# Patient Record
Sex: FEMALE | Race: WHITE | NOT HISPANIC OR LATINO | Employment: FULL TIME | URBAN - NONMETROPOLITAN AREA
[De-identification: names, ages, dates, MRNs, and addresses within clinical notes are randomized per-mention and may not be internally consistent; named-entity substitution may affect disease eponyms.]

---

## 2017-08-29 ENCOUNTER — OFFICE VISIT (OUTPATIENT)
Dept: MEDICAL GROUP | Facility: CLINIC | Age: 57
End: 2017-08-29
Payer: MEDICARE

## 2017-08-29 VITALS
TEMPERATURE: 98.9 F | SYSTOLIC BLOOD PRESSURE: 142 MMHG | BODY MASS INDEX: 16.9 KG/M2 | HEART RATE: 81 BPM | OXYGEN SATURATION: 98 % | HEIGHT: 64 IN | DIASTOLIC BLOOD PRESSURE: 90 MMHG | WEIGHT: 99 LBS

## 2017-08-29 DIAGNOSIS — F31.10 BIPOLAR DISORDER, CURRENT EPISODE MANIC WITHOUT PSYCHOTIC FEATURES (HCC): ICD-10-CM

## 2017-08-29 DIAGNOSIS — Z12.11 SPECIAL SCREENING FOR MALIGNANT NEOPLASM OF COLON: ICD-10-CM

## 2017-08-29 DIAGNOSIS — Z13.6 SCREENING FOR CARDIOVASCULAR CONDITION: ICD-10-CM

## 2017-08-29 DIAGNOSIS — I10 ESSENTIAL HYPERTENSION: ICD-10-CM

## 2017-08-29 DIAGNOSIS — Z23 NEED FOR VACCINATION: ICD-10-CM

## 2017-08-29 DIAGNOSIS — Z12.31 VISIT FOR SCREENING MAMMOGRAM: ICD-10-CM

## 2017-08-29 PROCEDURE — 99213 OFFICE O/P EST LOW 20 MIN: CPT | Mod: 25 | Performed by: PHYSICIAN ASSISTANT

## 2017-08-29 PROCEDURE — G0009 ADMIN PNEUMOCOCCAL VACCINE: HCPCS | Performed by: PHYSICIAN ASSISTANT

## 2017-08-29 PROCEDURE — 90732 PPSV23 VACC 2 YRS+ SUBQ/IM: CPT | Performed by: PHYSICIAN ASSISTANT

## 2017-08-29 PROCEDURE — 90715 TDAP VACCINE 7 YRS/> IM: CPT | Performed by: PHYSICIAN ASSISTANT

## 2017-08-29 PROCEDURE — 90472 IMMUNIZATION ADMIN EACH ADD: CPT | Performed by: PHYSICIAN ASSISTANT

## 2017-08-29 RX ORDER — LISINOPRIL 10 MG/1
10 TABLET ORAL EVERY MORNING
Qty: 30 TAB | Refills: 2 | Status: SHIPPED | OUTPATIENT
Start: 2017-08-29 | End: 2018-01-12 | Stop reason: SDUPTHER

## 2017-08-29 ASSESSMENT — PATIENT HEALTH QUESTIONNAIRE - PHQ9
5. POOR APPETITE OR OVEREATING: 2 - MORE THAN HALF THE DAYS
SUM OF ALL RESPONSES TO PHQ QUESTIONS 1-9: 14
CLINICAL INTERPRETATION OF PHQ2 SCORE: 4

## 2017-08-29 NOTE — ASSESSMENT & PLAN NOTE
Patient was diagnosed with bipolar disorder approximately 20 years ago. She was started on paxil a few years after this and this has kept her happy but has not controlled her manic episodes. She would like to restart on some sort of medication but needs advice in regards to which one.

## 2017-08-29 NOTE — ASSESSMENT & PLAN NOTE
Patient has not been taking her lisinopril for about 1 year. She had been well controlled on 10mg in the past. And would like to retry this now. She also drinks nearly a pot of coffee every day and she does not restrict the salt in her diet.

## 2017-08-30 NOTE — PROGRESS NOTES
Chief Complaint   Patient presents with   • Establish Care     wants olanzapine for manic episodes/increases weight gain       HISTORY OF PRESENT ILLNESS: Patient is a 56 y.o. female established patient who presents today for evaluation and management of:    Bipolar disorder, current episode manic without psychotic features (CMS-HCC)  Patient was diagnosed with bipolar disorder approximately 20 years ago. She was started on paxil a few years after this and this has kept her happy but has not controlled her manic episodes. She would like to restart on some sort of medication but needs advice in regards to which one.     HTN (hypertension)  Patient has not been taking her lisinopril for about 1 year. She had been well controlled on 10mg in the past. And would like to retry this now. She also drinks nearly a pot of coffee every day and she does not restrict the salt in her diet.        Patient Active Problem List    Diagnosis Date Noted   • Bipolar disorder, current episode manic without psychotic features (CMS-HCC) 2017   • Breast lump 2016   • HTN (hypertension) 2013   • Cerebral infarction (CMS-HCC) 2013   • Depression        Allergies:Keflex and Morphine    Current Outpatient Prescriptions   Medication Sig Dispense Refill   • lisinopril (PRINIVIL) 10 MG Tab Take 1 Tab by mouth every morning. 30 Tab 2     No current facility-administered medications for this visit.        Social History   Substance Use Topics   • Smoking status: Current Every Day Smoker     Packs/day: 0.50     Years: 35.00     Types: Cigarettes   • Smokeless tobacco: Never Used   • Alcohol use No       Family Status   Relation Status   • Mother    • Father    No family history on file.    Review of Systems:   Constitutional: Negative for fever, chills, weight loss and malaise/fatigue.   HENT: Negative for ear pain, nosebleeds, congestion, sore throat and neck pain.    Eyes: Negative for blurred vision.  "  Respiratory: POsitive for Smokers occasional cough, sputum production, shortness of breath and wheezing.    Cardiovascular: Negative for chest pain, palpitations, orthopnea and leg swelling.   Gastrointestinal: Negative for heartburn, nausea, vomiting and abdominal pain.   Genitourinary: Negative for dysuria, urgency and frequency.   Musculoskeletal: Negative for myalgias, back pain and joint pain.   Skin: Negative for rash and itching.   Neurological: Negative for dizziness, tingling, tremors, sensory change, focal weakness and headaches.   Endo/Heme/Allergies: Does not bruise/bleed easily.   Psychiatric/Behavioral: Positive for depression with more frequent dion. Negative for, suicidal ideas and memory loss.  The patient is n frequently ot nervous/anxious and does not have insomnia.      Exam:  Blood pressure 142/90, pulse 81, temperature 37.2 °C (98.9 °F), height 1.626 m (5' 4\"), weight 44.9 kg (99 lb), SpO2 98 %.  Body mass index is 16.99 kg/m².  General:  Thin female in NAD  Head: is grossly normal.  Neck: Supple without masses. Thyroid is not visibly enlarged.  Pulmonary: Fine crackles auscultated in all lobes bilaterally. Normal effort at rest and in conversation. No rhonchi or wheeze auscultated.   Cardiovascular: Regular rate and rhythm without murmur. Carotid and radial pulses are intact and equal bilaterally.  Extremities: no clubbing, cyanosis, or edema.  Behavioral: Patient has very rapid speech and some flight of ideas however she has normal judgment and insight.    Medical decision-making and discussion:  1. Essential hypertension  Patient was advised to cut back her caffeine intake to one caffeinated beverage every day. She was advised to reduce the salt in her diet and increase her daily water intake.  - lisinopril (PRINIVIL) 10 MG Tab; Take 1 Tab by mouth every morning.  Dispense: 30 Tab; Refill: 2  - CMP (12)  - CBC WITHOUT DIFFERENTIAL; Future  - TSH WITH REFLEX TO FT4; Future    2. Bipolar " disorder, current episode manic without psychotic features (CMS-HCC)  Patient was advised that if she is unable to get into psychiatry very quickly, I am willing to continue writing for the Paxil prescription that she had received before while she is awaiting her appointment.  - REFERRAL TO PSYCHIATRY  - Patient has been identified as being depressed and appropriate orders and counseling have been given  - CBC WITHOUT DIFFERENTIAL; Future  - TSH WITH REFLEX TO FT4; Future    3. Visit for screening mammogram  - [id number]; Future    4. Special screening for malignant neoplasm of colon  - OCCULT BLOOD,FECAL,IMMUNOASSAY    5. Need for vaccination  - Tdap =>6yo IM  - PneumoVax PPV23 =>3yo    6. Screening for cardiovascular condition  - LIPID PANEL      Please note that this dictation was created using voice recognition software. I have made every reasonable attempt to correct obvious errors, but I expect that there are errors of grammar and possibly content that I did not discover before finalizing the note.      Return for 1 week BP recheck MA visit, 6 week HTN checkup, ASAP female annual 40min.

## 2017-10-02 ENCOUNTER — OFFICE VISIT (OUTPATIENT)
Dept: URGENT CARE | Facility: PHYSICIAN GROUP | Age: 57
End: 2017-10-02
Payer: MEDICARE

## 2017-10-02 VITALS
SYSTOLIC BLOOD PRESSURE: 140 MMHG | HEART RATE: 86 BPM | RESPIRATION RATE: 18 BRPM | OXYGEN SATURATION: 94 % | DIASTOLIC BLOOD PRESSURE: 98 MMHG | BODY MASS INDEX: 16.9 KG/M2 | TEMPERATURE: 98.5 F | WEIGHT: 99 LBS | HEIGHT: 64 IN

## 2017-10-02 DIAGNOSIS — S16.1XXA ACUTE STRAIN OF NECK MUSCLE, INITIAL ENCOUNTER: ICD-10-CM

## 2017-10-02 DIAGNOSIS — M25.511 ACUTE PAIN OF RIGHT SHOULDER: ICD-10-CM

## 2017-10-02 PROCEDURE — 99214 OFFICE O/P EST MOD 30 MIN: CPT | Performed by: PHYSICIAN ASSISTANT

## 2017-10-02 RX ORDER — ACETAMINOPHEN AND CODEINE PHOSPHATE 300; 30 MG/1; MG/1
1-2 TABLET ORAL EVERY 6 HOURS PRN
Qty: 15 TAB | Refills: 0 | Status: SHIPPED | OUTPATIENT
Start: 2017-10-02 | End: 2017-10-11

## 2017-10-02 NOTE — PROGRESS NOTES
Chief Complaint   Patient presents with   • Neck Pain     pain radiating to shoulder, seen at [facility] on 29OCT17, Dx with muscle tear       HISTORY OF PRESENT ILLNESS: Patient is a 56 y.o. female who presents today for the following:    Patient comes in for evaluation of neck pain. 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 She was seen at the emergency room the following day. She was put on diazepam but it does not seem to be helping. She states the only thing that seems to help her is Tylenol threes. She complains of pain on the right side of her neck extending into the right shoulder. She has worsening pain with any rotation to the right. Patient reports a history of stroke with right-sided deficit. She has not taken any over-the-counter medication because it never works. She denies distal paresthesias.    Patient Active Problem List    Diagnosis Date Noted   • Bipolar disorder, current episode manic without psychotic features (CMS-HCC) 08/29/2017   • Breast lump 02/09/2016   • HTN (hypertension) 07/23/2013   • Cerebral infarction (CMS-HCC) 07/23/2013   • Depression        Allergies:Keflex; Morphine; and Sulfa drugs    Current Outpatient Prescriptions Ordered in [facility]   Medication Sig Dispense Refill   • Acetaminophen-Codeine 300-30 MG Tab Take 1-2 Tabs by mouth every 6 hours as needed (pain). 15 Tab 0   • lisinopril (PRINIVIL) 10 MG Tab Take 1 Tab by mouth every morning. 30 Tab 2     No current Epic-ordered facility-administered medications on file.        Past Medical History:   Diagnosis Date   • Breast lump 2/9/2016   • Bipolar affective (CMS-HCC)    • Hyperlipidemia    • Hypertension    • Stroke (CMS-HCC)     at age 7 cva due to htn, no residual sx       Social History   Substance Use Topics   • Smoking status: Current Every Day Smoker     Packs/day: 0.50     Years: 35.00     Types: Cigarettes   • Smokeless tobacco: Never Used   • Alcohol use No       Family Status  "  Relation Status   • Mother    • Father    History reviewed. No pertinent family history.    ROS:    Review of Systems   Constitutional: Negative for fever, chills, weight loss and malaise/fatigue.   HENT: Negative for ear pain, nosebleeds, congestion, sore throat and neck pain.    Eyes: Negative for blurred vision.   Respiratory: Negative for cough, sputum production, shortness of breath and wheezing.    Cardiovascular: Negative for chest pain, palpitations, orthopnea and leg swelling.   Gastrointestinal: Negative for heartburn, nausea, vomiting and abdominal pain.   Genitourinary: Negative for dysuria, urgency and frequency.       Exam:  Blood pressure 140/98, pulse 86, temperature 36.9 °C (98.5 °F), resp. rate 18, height 1.626 m (5' 4\"), weight 44.9 kg (99 lb), SpO2 94 %.  General: Well developed, well nourished. No distress.  HEENT: Head is grossly normal.  Neck: Decreased rotation to the right due to pain. No localized tenderness noted.  Pulmonary: Clear to ausculation and percussion.  Normal effort. No rales, ronchi, or wheezing.   Cardiovascular: Regular rate and rhythm without murmur. No edema. No chest wall tenderness noted.  Neurologic: Grossly nonfocal.  Extremities: Decreased range of motion of the right shoulder the patient states this is standard since her stroke.  Skin: Warm, dry, good turgor. No rashes in visible areas.   Psych: Normal mood. Alert and oriented x3. Judgment and insight is normal.    Assessment/Plan:  Apply heat to affected area. Take all medications as directed. Follow up for worsening or persistent symptoms.  1. Acute strain of neck muscle, initial encounter  Acetaminophen-Codeine 300-30 MG Tab    Likely muscle spasms.   2. Acute pain of right shoulder      Likely muscle spasms.       "

## 2017-10-11 ENCOUNTER — OFFICE VISIT (OUTPATIENT)
Dept: MEDICAL GROUP | Facility: CLINIC | Age: 57
End: 2017-10-11
Payer: MEDICARE

## 2017-10-11 VITALS
TEMPERATURE: 99.2 F | RESPIRATION RATE: 16 BRPM | WEIGHT: 100 LBS | SYSTOLIC BLOOD PRESSURE: 132 MMHG | DIASTOLIC BLOOD PRESSURE: 102 MMHG | HEART RATE: 90 BPM | HEIGHT: 64 IN | OXYGEN SATURATION: 96 % | BODY MASS INDEX: 17.07 KG/M2

## 2017-10-11 DIAGNOSIS — M25.511 ACUTE PAIN OF RIGHT SHOULDER: ICD-10-CM

## 2017-10-11 DIAGNOSIS — I10 ESSENTIAL HYPERTENSION: ICD-10-CM

## 2017-10-11 DIAGNOSIS — Z23 NEED FOR VACCINATION: ICD-10-CM

## 2017-10-11 DIAGNOSIS — F19.90 DRUG USE: ICD-10-CM

## 2017-10-11 DIAGNOSIS — Z12.11 SPECIAL SCREENING FOR MALIGNANT NEOPLASM OF COLON: ICD-10-CM

## 2017-10-11 PROCEDURE — 99212 OFFICE O/P EST SF 10 MIN: CPT | Mod: 25 | Performed by: PHYSICIAN ASSISTANT

## 2017-10-11 RX ORDER — CYCLOBENZAPRINE HCL 5 MG
5-10 TABLET ORAL 3 TIMES DAILY PRN
Qty: 30 TAB | Refills: 0 | Status: SHIPPED | OUTPATIENT
Start: 2017-10-11 | End: 2020-01-20

## 2017-10-11 NOTE — ASSESSMENT & PLAN NOTE
Patient's blood pressure is elevated today and although the patient is in pain, she has disorganized speech, does not comprehend plan of care well, as she asked multiple times what exactly was being done today and has bodily movements indicative of drug use including fidgeting, mouth movements and rapid speech.

## 2017-10-11 NOTE — ASSESSMENT & PLAN NOTE
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 She states that a couple of hours afterwards, she began to feel. In her neck and shoulder which felt like \"a ball of muscles \". She went to the emergency room and was given Tylenol with Codeine 300-30 mg and is requesting this medication again today. She was prescribed 15 tablets on October 2, 2017.  "

## 2017-10-11 NOTE — PROGRESS NOTES
"Chief Complaint   Patient presents with   • Hospital Follow-up       HISTORY OF PRESENT ILLNESS: Patient is a 56 y.o. 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 She states that a couple of hours afterwards, she began to feel. In her neck and shoulder which felt like \"a ball of muscles \". She went to the emergency room and was given Tylenol with Codeine 300-30 mg and is requesting this medication again today. She was prescribed 15 tablets on October 2, 2017.    Drug use  Patient's blood pressure is elevated today and although the patient is in pain, she has disorganized speech, does not comprehend plan of care well, as she asked multiple times what exactly was being done today and has bodily movements indicative of drug use including fidgeting, mouth movements and rapid speech.     HTN (hypertension)  Patient is taking her lisinopril occasionally. She continues to drink coffee.       Patient Active Problem List    Diagnosis Date Noted   • Acute pain of right shoulder 10/11/2017   • Drug use 10/11/2017   • Special screening for malignant neoplasm of colon 10/11/2017   • Bipolar disorder, current episode manic without psychotic features (CMS-HCC) 08/29/2017   • Breast lump 02/09/2016   • HTN (hypertension) 07/23/2013   • Cerebral infarction (CMS-HCC) 07/23/2013   • Depression        Allergies:Diazepam; Keflex; Morphine; and Sulfa drugs    Current Outpatient Prescriptions   Medication Sig Dispense Refill   • cyclobenzaprine (FLEXERIL) 5 MG tablet Take 1-2 Tabs by mouth 3 times a day as needed for Muscle Spasms. 30 Tab 0   • lisinopril (PRINIVIL) 10 MG Tab Take 1 Tab by mouth every morning. 30 Tab 2     No current facility-administered medications for this visit.        Social History   Substance Use Topics   • Smoking status: Current Every Day Smoker     Packs/day: 0.50     Years: 35.00 " "    Types: Cigarettes   • Smokeless tobacco: Never Used   • Alcohol use No       Family Status   Relation Status   • Mother    • Father    History reviewed. No pertinent family history.    Review of Systems:   Constitutional: Negative for fever, chills, weight loss and malaise/fatigue.   HENT: Negative for ear pain, nosebleeds, congestion, sore throat.    Eyes: Negative for blurred vision.   Musculoskeletal: See HPI above. Negative for joint pain.   Skin: Negative for rash and itching.   Neurological: Negative for dizziness, tingling, tremors, sensory change, focal weakness and headaches.   Endo/Heme/Allergies: Does not bruise/bleed easily.   Psychiatric/Behavioral: Negative for depression, suicidal ideas and memory loss.  The patient is not nervous/anxious and does not have insomnia.      Exam:  Blood pressure 132/102, pulse 90, temperature 37.3 °C (99.2 °F), resp. rate 16, height 1.626 m (5' 4\"), weight 45.4 kg (100 lb), SpO2 96 %.  Body mass index is 17.16 kg/m².  General:  Thin and Ill-Appearing female in NAD  Head: is grossly normal. Patient has tongue gyrations.   Neck: Stiff without masses. Thyroid is not visibly enlarged.  Pulmonary:  Normal effort.  Cardiovascular: Radial pulses are intact and equal bilaterally.  Extremities: no clubbing, cyanosis, or edema.  Musculoskeletal: Tenderness to palpation of right upper back and trapezoid region. Normal strength and range of motion in all directions with bilateral shoulders.     Medical decision-making and discussion:  1. Acute pain of right shoulder  Patient was advised to use heat packs in addition to resting her right side. She was advised that she would not be given acetaminophen with codeine today as this is likely just alleviating her pain and not relieving the tense muscle that is likely the cause of her pain. This was repeated 3 times and patient thoroughly educated as to why this is a better course of action.  - cyclobenzaprine (FLEXERIL) " 5 MG tablet; Take 1-2 Tabs by mouth 3 times a day as needed for Muscle Spasms.  Dispense: 30 Tab; Refill: 0    2. Need for vaccination  - Flu Quad Inj >3 Year Pre-Filled PF    3. Drug use  Other patient denies drug use, it is suspected that due to her behavior and abnormal ability to process information, she may be using illicit substances.  - [facility] SCREEN; Future    4. Essential hypertension  It is suspected that the patient's blood pressure elevation may be due to check use and the fact that she does not take her lisinopril 10 mg tablets as directed. This is interviewed with the patient in the past and she is not compliant with her medication regimen.  - [facility]IN [facility] SCREEN; Future    5. Special screening for malignant neoplasm of colon  - OCCULT BLOOD FECES IMMUNOASSAY (FIT); Future      Please note that this dictation was created using voice recognition software. I have made every reasonable attempt to correct obvious errors, but I expect that there are errors of grammar and possibly content that I did not discover before finalizing the note.      Return for ASAP pap smear.

## 2017-10-12 PROCEDURE — G0008 ADMIN INFLUENZA VIRUS VAC: HCPCS | Performed by: PHYSICIAN ASSISTANT

## 2017-10-12 PROCEDURE — 90686 IIV4 VACC NO PRSV 0.5 ML IM: CPT | Performed by: PHYSICIAN ASSISTANT

## 2017-11-06 PROBLEM — R89.2 ABNORMAL DRUG SCREEN: Chronic | Status: ACTIVE | Noted: 2017-11-06

## 2017-11-06 PROBLEM — R89.2 ABNORMAL DRUG SCREEN: Status: ACTIVE | Noted: 2017-11-06

## 2018-01-04 ENCOUNTER — OFFICE VISIT (OUTPATIENT)
Dept: URGENT CARE | Facility: PHYSICIAN GROUP | Age: 58
End: 2018-01-04
Payer: MEDICARE

## 2018-01-04 VITALS
HEIGHT: 64 IN | OXYGEN SATURATION: 98 % | WEIGHT: 97 LBS | SYSTOLIC BLOOD PRESSURE: 142 MMHG | TEMPERATURE: 98.6 F | DIASTOLIC BLOOD PRESSURE: 100 MMHG | BODY MASS INDEX: 16.56 KG/M2 | HEART RATE: 80 BPM

## 2018-01-04 DIAGNOSIS — M79.18 MYOFASCIAL PAIN: ICD-10-CM

## 2018-01-04 PROCEDURE — 99214 OFFICE O/P EST MOD 30 MIN: CPT | Performed by: PHYSICIAN ASSISTANT

## 2018-01-04 RX ORDER — DICLOFENAC SODIUM 75 MG/1
75 TABLET, DELAYED RELEASE ORAL 2 TIMES DAILY
Qty: 30 TAB | Refills: 0 | Status: SHIPPED | OUTPATIENT
Start: 2018-01-04 | End: 2020-01-20

## 2018-01-04 RX ORDER — METHOCARBAMOL 750 MG/1
750 TABLET, FILM COATED ORAL 3 TIMES DAILY
Qty: 30 TAB | Refills: 0 | Status: SHIPPED | OUTPATIENT
Start: 2018-01-04 | End: 2020-01-20

## 2018-01-04 NOTE — PATIENT INSTRUCTIONS
"Smoking Cessation, Tips for Success  If you are ready to quit smoking, congratulations! You have chosen to help yourself be healthier. Cigarettes bring nicotine, tar, carbon monoxide, and other irritants into your body. Your lungs, heart, and blood vessels will be able to work better without these poisons. There are many different ways to quit smoking. Nicotine gum, nicotine patches, a nicotine inhaler, or nicotine nasal spray can help with physical craving. Hypnosis, support groups, and medicines help break the habit of smoking.  WHAT THINGS CAN I DO TO MAKE QUITTING EASIER?   Here are some tips to help you quit for good:  · Pick a date when you will quit smoking completely. Tell all of your friends and family about your plan to quit on that date.  · Do not try to slowly cut down on the number of cigarettes you are smoking. Pick a quit date and quit smoking completely starting on that day.  · Throw away all cigarettes.    · Clean and remove all ashtrays from your home, work, and car.  · On a card, write down your reasons for quitting. Carry the card with you and read it when you get the urge to smoke.  · Cleanse your body of nicotine. Drink enough water and fluids to keep your urine clear or pale yellow. Do this after quitting to flush the nicotine from your body.  · Learn to predict your moods. Do not let a bad situation be your excuse to have a cigarette. Some situations in your life might tempt you into wanting a cigarette.  · Never have \"just one\" cigarette. It leads to wanting another and another. Remind yourself of your decision to quit.  · Change habits associated with smoking. If you smoked while driving or when feeling stressed, try other activities to replace smoking. Stand up when drinking your coffee. Brush your teeth after eating. Sit in a different chair when you read the paper. Avoid alcohol while trying to quit, and try to drink fewer caffeinated beverages. Alcohol and caffeine may urge you to " "smoke.  · Avoid foods and drinks that can trigger a desire to smoke, such as sugary or spicy foods and alcohol.  · Ask people who smoke not to smoke around you.  · Have something planned to do right after eating or having a cup of coffee. For example, plan to take a walk or exercise.  · Try a relaxation exercise to calm you down and decrease your stress. Remember, you may be tense and nervous for the first 2 weeks after you quit, but this will pass.  · Find new activities to keep your hands busy. Play with a pen, coin, or rubber band. Doodle or draw things on paper.  · Brush your teeth right after eating. This will help cut down on the craving for the taste of tobacco after meals. You can also try mouthwash.    · Use oral substitutes in place of cigarettes. Try using lemon drops, carrots, cinnamon sticks, or chewing gum. Keep them handy so they are available when you have the urge to smoke.  · When you have the urge to smoke, try deep breathing.  · Designate your home as a nonsmoking area.  · If you are a heavy smoker, ask your health care provider about a prescription for nicotine chewing gum. It can ease your withdrawal from nicotine.  · Reward yourself. Set aside the cigarette money you save and buy yourself something nice.  · Look for support from others. Join a support group or smoking cessation program. Ask someone at home or at work to help you with your plan to quit smoking.  · Always ask yourself, \"Do I need this cigarette or is this just a reflex?\" Tell yourself, \"Today, I choose not to smoke,\" or \"I do not want to smoke.\" You are reminding yourself of your decision to quit.  · Do not replace cigarette smoking with electronic cigarettes (commonly called e-cigarettes). The safety of e-cigarettes is unknown, and some may contain harmful chemicals.  · If you relapse, do not give up! Plan ahead and think about what you will do the next time you get the urge to smoke.  HOW WILL I FEEL WHEN I QUIT SMOKING?  You " may have symptoms of withdrawal because your body is used to nicotine (the addictive substance in cigarettes). You may crave cigarettes, be irritable, feel very hungry, cough often, get headaches, or have difficulty concentrating. The withdrawal symptoms are only temporary. They are strongest when you first quit but will go away within 10-14 days. When withdrawal symptoms occur, stay in control. Think about your reasons for quitting. Remind yourself that these are signs that your body is healing and getting used to being without cigarettes. Remember that withdrawal symptoms are easier to treat than the major diseases that smoking can cause.   Even after the withdrawal is over, expect periodic urges to smoke. However, these cravings are generally short lived and will go away whether you smoke or not. Do not smoke!  WHAT RESOURCES ARE AVAILABLE TO HELP ME QUIT SMOKING?  Your health care provider can direct you to community resources or hospitals for support, which may include:  · Group support.  · Education.  · Hypnosis.  · Therapy.     This information is not intended to replace advice given to you by your health care provider. Make sure you discuss any questions you have with your health care provider.     Document Released: 09/15/2005 Document Revised: 01/08/2016 Document Reviewed: 06/05/2014  [employer] Patient Education ©2016 [employer]

## 2018-01-04 NOTE — PROGRESS NOTES
Chief Complaint   Patient presents with   • Shoulder Pain       HISTORY OF PRESENT ILLNESS: Patient is a 57 y.o. female who presents today because she has a 1-1/2 year history of intermittent upper back and shoulder pain posteriorly. She states that she has tried Flexeril for this before and it did not help. She has been using some over-the-counter anti-inflammatories and capsaicin P patches which help a little bit. No distal paresthesias or radiating upper or lower extremity pain    Patient Active Problem List    Diagnosis Date Noted   • Abnormal drug screen 11/06/2017   • Acute pain of right shoulder 10/11/2017   • Drug use 10/11/2017   • Special screening for malignant neoplasm of colon 10/11/2017   • Bipolar disorder, current episode manic without psychotic features (CMS-HCC) 08/29/2017   • Breast lump 02/09/2016   • HTN (hypertension) 07/23/2013   • Cerebral infarction (CMS-HCC) 07/23/2013   • Depression        Allergies:Diazepam; Keflex; Morphine; and Sulfa drugs    Current Outpatient Prescriptions Ordered in [facility]   Medication Sig Dispense Refill   • diclofenac EC (VOLTAREN) 75 MG Tablet Delayed Response Take 1 Tab by mouth 2 times a day. 30 Tab 0   • methocarbamol (ROBAXIN) 750 MG Tab Take 1 Tab by mouth 3 times a day. 30 Tab 0   • lisinopril (PRINIVIL) 10 MG Tab Take 1 Tab by mouth every morning. 30 Tab 2   • cyclobenzaprine (FLEXERIL) 5 MG tablet Take 1-2 Tabs by mouth 3 times a day as needed for Muscle Spasms. 30 Tab 0     No current Epic-ordered facility-administered medications on file.        Past Medical History:   Diagnosis Date   • Bipolar affective (CMS-HCC)    • Breast lump 2/9/2016   • Hyperlipidemia    • Hypertension    • Stroke (CMS-HCC)     at age 7 cva due to htn, no residual sx       Social History   Substance Use Topics   • Smoking status: Current Every Day Smoker     Packs/day: 0.50     Years: 35.00     Types: Cigarettes   • Smokeless tobacco: Never Used   • Alcohol use No       Family  "Status   Relation Status   • Mother    • Father    History reviewed. No pertinent family history.    ROS:  Review of Systems   Constitutional: Negative for fever, chills, weight loss and malaise/fatigue.   HENT: Negative for ear pain, nosebleeds, congestion, sore throat and neck pain.    Eyes: Negative for blurred vision.   Respiratory: Negative for cough, sputum production, shortness of breath and wheezing.    Cardiovascular: Negative for chest pain, palpitations, orthopnea and leg swelling.   Gastrointestinal: Negative for heartburn, nausea, vomiting and abdominal pain.       Exam:  Blood pressure 142/100, pulse 80, temperature 37 °C (98.6 °F), height 1.626 m (5' 4\"), weight 44 kg (97 lb), SpO2 98 %.  General:  Well nourished, well developed female in NAD  Head:Normocephalic, atraumatic  Eyes: PERRLA, EOM within normal limits, no conjunctival injection, no scleral icterus, visual fields and acuity grossly intact.  Extremities: no clubbing, cyanosis, or edema.  Musculoskeletal: She has multiple areas of tenderness including her trapezius bilaterally, parascapular muscles, paraspinous muscles of her cervical spine. No vertebral point tenderness    Please note that this dictation was created using voice recognition software. I have made every reasonable attempt to correct obvious errors, but I expect that there are errors of grammar and possibly content that I did not discover before finalizing the note.    Assessment/Plan:  1. Myofascial pain  diclofenac EC (VOLTAREN) 75 MG Tablet Delayed Response    methocarbamol (ROBAXIN) 750 MG Tab   apply heat to the areas and follow up with primary care    Followup with primary care in the next 7-10 days if not significantly improving, return to the urgent care or go to the emergency room sooner for any worsening of symptoms.       "

## 2018-01-12 DIAGNOSIS — I10 ESSENTIAL HYPERTENSION: ICD-10-CM

## 2018-01-15 RX ORDER — LISINOPRIL 10 MG/1
10 TABLET ORAL EVERY MORNING
Qty: 30 TAB | Refills: 0 | Status: SHIPPED | OUTPATIENT
Start: 2018-01-15 | End: 2020-01-20

## 2019-09-24 ENCOUNTER — PATIENT MESSAGE (OUTPATIENT)
Dept: MEDICAL GROUP | Facility: CLINIC | Age: 59
End: 2019-09-24

## 2019-09-24 DIAGNOSIS — M67.949: ICD-10-CM

## 2019-09-25 NOTE — TELEPHONE ENCOUNTER
From: Love Hidalgo  To: Jennifer Gustafson P.A.-C.  Sent: 9/24/2019 11:50 PM PDT  Subject: Procedure Question    I was going to be referred to a hand specialist due to a severed tendon right hand index finger. Dr. Duncan is not available right now to help I truly need an appointment with [place] urgent care in regards to this, I do not want to lose my finger. Please help, I can be reached at [phone], thank you

## 2019-12-27 ENCOUNTER — TELEPHONE (OUTPATIENT)
Dept: SCHEDULING | Facility: IMAGING CENTER | Age: 59
End: 2019-12-27

## 2020-01-10 ENCOUNTER — APPOINTMENT (OUTPATIENT)
Dept: MEDICAL GROUP | Facility: CLINIC | Age: 60
End: 2020-01-10
Payer: MEDICARE

## 2020-01-20 ENCOUNTER — OFFICE VISIT (OUTPATIENT)
Dept: URGENT CARE | Facility: PHYSICIAN GROUP | Age: 60
End: 2020-01-20
Payer: MEDICARE

## 2020-01-20 VITALS
HEART RATE: 85 BPM | SYSTOLIC BLOOD PRESSURE: 160 MMHG | HEIGHT: 64 IN | BODY MASS INDEX: 17.38 KG/M2 | OXYGEN SATURATION: 98 % | DIASTOLIC BLOOD PRESSURE: 98 MMHG | WEIGHT: 101.8 LBS | RESPIRATION RATE: 16 BRPM | TEMPERATURE: 98.5 F

## 2020-01-20 DIAGNOSIS — R05.8 POST-VIRAL COUGH SYNDROME: ICD-10-CM

## 2020-01-20 DIAGNOSIS — I10 ESSENTIAL HYPERTENSION: ICD-10-CM

## 2020-01-20 PROCEDURE — 99214 OFFICE O/P EST MOD 30 MIN: CPT | Performed by: NURSE PRACTITIONER

## 2020-01-20 RX ORDER — LISINOPRIL 10 MG/1
10 TABLET ORAL DAILY
Qty: 30 TAB | Refills: 0 | Status: SHIPPED | OUTPATIENT
Start: 2020-01-20 | End: 2020-02-19

## 2020-01-20 RX ORDER — BUSPIRONE HYDROCHLORIDE 15 MG/1
15 TABLET ORAL 2 TIMES DAILY
COMMUNITY

## 2020-01-20 RX ORDER — BENZONATATE 100 MG/1
100 CAPSULE ORAL 3 TIMES DAILY PRN
Qty: 60 CAP | Refills: 0 | Status: SHIPPED | OUTPATIENT
Start: 2020-01-20

## 2020-01-20 ASSESSMENT — ENCOUNTER SYMPTOMS
SHORTNESS OF BREATH: 0
FEVER: 1
WHEEZING: 0
CHILLS: 1
DIZZINESS: 0
COUGH: 1
HEADACHES: 0
SORE THROAT: 0

## 2020-01-20 NOTE — PROGRESS NOTES
Subjective:      Love Hidalgo is a 59 y.o. female who presents with Cough (was seen in [place])            Cough   This is a new problem. Episode onset: 2 weeks. The problem has been gradually improving. The problem occurs every few minutes. The cough is productive of sputum. Associated symptoms include chills, a fever and postnasal drip. Pertinent negatives include no headaches, sore throat, shortness of breath or wheezing. Associated symptoms comments: Patient was seen in [facility] and states that she had a cough that produced sputum that was green and toothpaste in consistency. States that she did have a subjective fever but now her cough is just annoying and she is worried that it has gone into her lungs and feels like it has just hung on too long and is worried that it has developed into a pneumonia. Patient also has lost her PCP and  Needs hypertension medication. She doesn't want to establish with the doctor in [place] so would like a referral. The symptoms are aggravated by lying down. Risk factors for lung disease include smoking/tobacco exposure. She has tried OTC cough suppressant for the symptoms. The treatment provided mild relief.       Review of Systems   Constitutional: Positive for chills, fever and malaise/fatigue.   HENT: Positive for postnasal drip. Negative for sore throat.    Respiratory: Positive for cough. Negative for shortness of breath and wheezing.    Neurological: Negative for dizziness and headaches.     Past Medical History:   Diagnosis Date   • Allergy    • Anxiety    • Bipolar affective (HCC)    • Breast lump 2/9/2016   • Depression    • Hyperlipidemia    • Hypertension    • Stroke (HCC)     at age 7 cva due to htn, no residual sx   • Substance abuse (HCC)     History reviewed. No pertinent surgical history.   Social History     Socioeconomic History   • Marital status:      Spouse name: Not on file   • Number of children: Not on file   • Years of  "education: Not on file   • Highest education level: Not on file   Occupational History   • Not on file   Social Needs   • Financial resource strain: Not on file   • Food insecurity:     Worry: Not on file     Inability: Not on file   • Transportation needs:     Medical: Not on file     Non-medical: Not on file   Tobacco Use   • Smoking status: Current Every Day Smoker     Packs/day: 0.50     Years: 35.00     Pack years: 17.50     Types: Cigarettes   • Smokeless tobacco: Never Used   Substance and Sexual Activity   • Alcohol use: No     Alcohol/week: 0.0 oz   • Drug use: Yes     Types: Methamphetamines   • Sexual activity: Not on file   Lifestyle   • Physical activity:     Days per week: Not on file     Minutes per session: Not on file   • Stress: Not on file   Relationships   • Social connections:     Talks on phone: Not on file     Gets together: Not on file     Attends [employer] service: Not on file     Active member of club or organization: Not on file     Attends meetings of clubs or organizations: Not on file     Relationship status: Not on file   • Intimate partner violence:     Fear of current or ex partner: Not on file     Emotionally abused: Not on file     Physically abused: Not on file     Forced sexual activity: Not on file   Other Topics Concern   • Not on file   Social History Narrative   • Not on file    Allergies: Diazepam; Keflex; Morphine; and Sulfa drugs         Objective:     /98   Pulse 85   Temp 36.9 °C (98.5 °F) (Temporal)   Resp 16   Ht 1.626 m (5' 4\")   Wt 46.2 kg (101 lb 12.8 oz)   SpO2 98%   BMI 17.47 kg/m²      Physical Exam  Vitals signs reviewed.   Constitutional:       Appearance: Normal appearance.   HENT:      Right Ear: Tympanic membrane, ear canal and external ear normal.      Left Ear: Tympanic membrane, ear canal and external ear normal.      Nose: Nose normal.      Mouth/Throat:      Lips: Pink.      Mouth: Mucous membranes are moist.      Pharynx: Uvula midline.    "   Comments: Post nasal drip noted  Cardiovascular:      Rate and Rhythm: Normal rate and regular rhythm.      Heart sounds: Normal heart sounds.   Pulmonary:      Effort: Pulmonary effort is normal.      Breath sounds: Normal breath sounds.   Lymphadenopathy:      Cervical: Cervical adenopathy present.   Skin:     General: Skin is warm.   Neurological:      Mental Status: She is alert.   Psychiatric:         Mood and Affect: Mood normal.         Behavior: Behavior normal.         Thought Content: Thought content normal.         Judgment: Judgment normal.                 Assessment/Plan:       1. Post-viral cough syndrome  - benzonatate (TESSALON) 100 MG Cap; Take 1 Cap by mouth 3 times a day as needed for Cough.  Dispense: 60 Cap; Refill: 0    2. Essential hypertension  - REFERRAL TO FOLLOW-UP WITH PRIMARY CARE  - lisinopril (PRINIVIL) 10 MG Tab; Take 1 Tab by mouth every day for 30 days.  Dispense: 30 Tab; Refill: 0    Discussed physical examination findings are likely viral in etiology and her cough is likely due to a post viral cough syndrome.  Discussed that I am not concerned that patient has a pneumonia as she has great air exchange and no fluid-like sounds in her lungs today.  Will treat symptomatically with Tessalon Perles.  Also discussed patient's hypertension. Provider rechecked blood pressure and it was 156/90. Will give patient 30 days of lisinopril and a referral to primary care to see if there is anybody else taking patients besides primary care physician in [facility].  Instructed patient that she needs to establish with a primary care physician and urgent care is not an appropriate place for her to be coming for refills.  Patient expressed understanding and agrees to plan.     Supportive care, differential diagnoses, and indications for immediate follow-up discussed with patient.    Pathogenesis of diagnosis discussed including typical length and natural progression. Patient expresses  understanding and agrees to plan.    Instructed patient to return to clinic for worsening symptoms or symptoms that persist for 7 to 10 days     Please note that this dictation was created using voice recognition software. I have made every reasonable attempt to correct obvious errors, but I expect that there are errors of grammar and possibly content that I did not discover before finalizing the note.

## 2020-12-19 ENCOUNTER — OFFICE VISIT (OUTPATIENT)
Dept: URGENT CARE | Facility: PHYSICIAN GROUP | Age: 60
End: 2020-12-19
Payer: MEDICARE

## 2020-12-19 VITALS
TEMPERATURE: 98.5 F | BODY MASS INDEX: 17.14 KG/M2 | WEIGHT: 100.4 LBS | HEART RATE: 105 BPM | HEIGHT: 64 IN | DIASTOLIC BLOOD PRESSURE: 88 MMHG | SYSTOLIC BLOOD PRESSURE: 144 MMHG | OXYGEN SATURATION: 96 % | RESPIRATION RATE: 16 BRPM

## 2020-12-19 DIAGNOSIS — M62.838 TRAPEZIUS MUSCLE SPASM: ICD-10-CM

## 2020-12-19 PROCEDURE — 99214 OFFICE O/P EST MOD 30 MIN: CPT | Performed by: FAMILY MEDICINE

## 2020-12-19 RX ORDER — CYCLOBENZAPRINE HCL 5 MG
5-10 TABLET ORAL 3 TIMES DAILY PRN
Qty: 30 TAB | Refills: 0 | Status: SHIPPED | OUTPATIENT
Start: 2020-12-19 | End: 2020-12-19 | Stop reason: SDUPTHER

## 2020-12-19 RX ORDER — FLUOXETINE HYDROCHLORIDE 20 MG/1
CAPSULE ORAL
COMMUNITY
Start: 2020-10-23

## 2020-12-19 RX ORDER — CYCLOBENZAPRINE HCL 5 MG
5-10 TABLET ORAL 3 TIMES DAILY PRN
Qty: 30 TAB | Refills: 0 | Status: SHIPPED | OUTPATIENT
Start: 2020-12-19

## 2020-12-19 RX ORDER — LISINOPRIL 10 MG/1
TABLET ORAL
COMMUNITY
Start: 2020-10-23

## 2020-12-19 NOTE — PROGRESS NOTES
"Subjective:      Chief Complaint   Patient presents with   • Neck pain                         Neck Pain   This is a new problem. Episode onset: 5 d ago. The problem occurs constantly. The problem has been unchanged.   Denies trauma.   States that she feels \"knots\" in lower neck muscles.     The pain is present in the both sides. The quality of the pain is described as \" sore and aching.\"     The pain is moderate. The symptoms are aggravated by position. Stiffness is present in the morning.    Denies numbness or tingling.         Pt has tried nothing for the symptoms.     Past Medical History:   Diagnosis Date   • Allergy    • Anxiety    • Bipolar affective (HCC)    • Breast lump 2/9/2016   • Depression    • Hyperlipidemia    • Hypertension    • Stroke (HCC)     at age 7 cva due to htn, no residual sx   • Substance abuse (HCC)          Social History     Tobacco Use   • Smoking status: Current Every Day Smoker     Packs/day: 0.50     Years: 35.00     Pack years: 17.50     Types: Cigarettes   • Smokeless tobacco: Never Used   Substance Use Topics   • Alcohol use: No     Alcohol/week: 0.0 oz   • Drug use: Yes     Types: Methamphetamines       Family hx was reviewed - no pertinent past family hx        Review of Systems   Constitutional: Negative for fever, chills and malaise/fatigue.   Eyes: Negative for vision changes, d/c.    Respiratory: Negative for cough and sputum production.    Cardiovascular: Negative for chest pain and palpitations.   Gastrointestinal: Negative for nausea, vomiting, abdominal pain, diarrhea and constipation.   Genitourinary: Negative for dysuria, urgency and frequency.   Skin: Negative for rash or  itching.   Neurological: Negative for dizziness and tingling.   Psychiatric/Behavioral: Negative for depression.   Hematologic/lymphatic - denies bruising or excessive bleeding  All other systems reviewed and are negative.         Objective:     /88   Pulse (!) 105   Temp 36.9 °C (98.5 °F) " "(Temporal)   Resp 16   Ht 1.626 m (5' 4\")   Wt 45.5 kg (100 lb 6.4 oz)   SpO2 96%       Physical Exam   Constitutional: pt is oriented to person, place, and time. Pt appears well-developed and well-nourished. No distress.   HENT:   Head: Normocephalic and atraumatic.   Eyes: Conjunctivae are normal.   Cardiovascular: Normal rate and regular rhythm.    Pulmonary/Chest: Effort normal and breath sounds normal. No respiratory distress. Pt has no wheezes.   Musculoskeletal:        Cervical back: pt exhibits full AROM.   No c-spine TTP.   + bilat trapezius muscle spasms  Neurological: pt is alert and oriented to person, place, and time.   Strength:    Deltoid - 5/5 on right  5/5 on left     - 5/5 on right, 5/5 on left   Skin: Skin is warm. Pt is not diaphoretic. No erythema.   Psychiatric:  Pt's behavior is normal.   Nursing note and vitals reviewed.              Assessment/Plan:       1. Trapezius muscle spasm     - diclofenac sodium 1 % Gel; Apply 1 Application topically every 8 hours as needed.  Dispense: 100 g; Refill: 0  - cyclobenzaprine (FLEXERIL) 5 mg tablet; Take 1-2 Tabs by mouth 3 times a day as needed.  Dispense: 30 Tab; Refill: 0      She sees pain management and will f/u with them next month.     "

## 2021-06-29 ENCOUNTER — HOSPITAL ENCOUNTER (OUTPATIENT)
Facility: MEDICAL CENTER | Age: 61
End: 2021-06-29
Attending: PHYSICIAN ASSISTANT
Payer: MEDICARE

## 2021-06-29 ENCOUNTER — OFFICE VISIT (OUTPATIENT)
Dept: URGENT CARE | Facility: PHYSICIAN GROUP | Age: 61
End: 2021-06-29
Payer: MEDICARE

## 2021-06-29 VITALS
WEIGHT: 97 LBS | OXYGEN SATURATION: 93 % | HEART RATE: 82 BPM | RESPIRATION RATE: 18 BRPM | TEMPERATURE: 99.3 F | HEIGHT: 63 IN | BODY MASS INDEX: 17.19 KG/M2 | SYSTOLIC BLOOD PRESSURE: 130 MMHG | DIASTOLIC BLOOD PRESSURE: 80 MMHG

## 2021-06-29 DIAGNOSIS — J22 LRTI (LOWER RESPIRATORY TRACT INFECTION): ICD-10-CM

## 2021-06-29 DIAGNOSIS — R05.9 COUGH: ICD-10-CM

## 2021-06-29 PROCEDURE — U0003 INFECTIOUS AGENT DETECTION BY NUCLEIC ACID (DNA OR RNA); SEVERE ACUTE RESPIRATORY SYNDROME CORONAVIRUS 2 (SARS-COV-2) (CORONAVIRUS DISEASE [COVID-19]), AMPLIFIED PROBE TECHNIQUE, MAKING USE OF HIGH THROUGHPUT TECHNOLOGIES AS DESCRIBED BY CMS-2020-01-R: HCPCS

## 2021-06-29 PROCEDURE — 99214 OFFICE O/P EST MOD 30 MIN: CPT | Performed by: PHYSICIAN ASSISTANT

## 2021-06-29 PROCEDURE — U0005 INFEC AGEN DETEC AMPLI PROBE: HCPCS

## 2021-06-29 RX ORDER — PAROXETINE 30 MG/1
TABLET, FILM COATED ORAL
COMMUNITY
Start: 2021-06-08

## 2021-06-29 RX ORDER — DEXTROMETHORPHAN HYDROBROMIDE AND PROMETHAZINE HYDROCHLORIDE 15; 6.25 MG/5ML; MG/5ML
5 SYRUP ORAL EVERY 4 HOURS PRN
Qty: 120 ML | Refills: 0 | Status: SHIPPED | OUTPATIENT
Start: 2021-06-29

## 2021-06-29 RX ORDER — DOXYCYCLINE HYCLATE 100 MG
100 TABLET ORAL 2 TIMES DAILY
Qty: 20 TABLET | Refills: 0 | Status: SHIPPED | OUTPATIENT
Start: 2021-06-29 | End: 2021-07-09

## 2021-06-29 RX ORDER — LISINOPRIL 20 MG/1
TABLET ORAL
COMMUNITY
Start: 2021-06-08

## 2021-06-29 RX ORDER — ALBUTEROL SULFATE 90 UG/1
2 AEROSOL, METERED RESPIRATORY (INHALATION) EVERY 4 HOURS PRN
Qty: 18.2 G | Refills: 0 | Status: SHIPPED | OUTPATIENT
Start: 2021-06-29

## 2021-06-29 NOTE — PROGRESS NOTES
Chief Complaint   Patient presents with   • Fatigue     weakness   • Cough     burning in her chest   • Shortness of Breath       HISTORY OF PRESENT ILLNESS: Patient is a 60 y.o. female who presents today because of a 3 to 4-day history of cough, weakness, burning in her chest, shortness of breath and overall feeling poorly.  She has not been taking any medications for her current symptoms.    Patient Active Problem List    Diagnosis Date Noted   • Abnormal drug screen 11/06/2017   • Acute pain of right shoulder 10/11/2017   • Drug use 10/11/2017   • Special screening for malignant neoplasm of colon 10/11/2017   • Bipolar disorder, current episode manic without psychotic features (HCC) 08/29/2017   • Breast lump 02/09/2016   • HTN (hypertension) 07/23/2013   • Cerebral infarction (HCC) 07/23/2013   • Depression        Allergies:Diazepam, Keflex, Morphine, and Sulfa drugs    Current Outpatient Medications Ordered in Epic   Medication Sig Dispense Refill   • lisinopril (PRINIVIL) 20 MG Tab      • PARoxetine (PAXIL) 30 MG Tab      • doxycycline (VIBRAMYCIN) 100 MG Tab Take 1 tablet by mouth 2 times a day for 10 days. 20 tablet 0   • albuterol 108 (90 Base) MCG/ACT Aero Soln inhalation aerosol Inhale 2 Puffs every four hours as needed. With spacer device 18.2 g 0   • promethazine-dextromethorphan (PROMETHAZINE-DM) 6.25-15 MG/5ML syrup Take 5 mL by mouth every four hours as needed for Cough. 120 mL 0   • diclofenac sodium 1 % Gel Apply 1 Application topically every 8 hours as needed. 100 g 0   • busPIRone (BUSPAR) 15 MG tablet Take 15 mg by mouth 2 times a day.     • FLUoxetine (PROZAC) 20 MG Cap  (Patient not taking: Reported on 6/29/2021)     • lisinopril (PRINIVIL) 10 MG Tab  (Patient not taking: Reported on 6/29/2021)     • cyclobenzaprine (FLEXERIL) 5 mg tablet Take 1-2 Tabs by mouth 3 times a day as needed. (Patient not taking: Reported on 6/29/2021) 30 Tab 0   • benzonatate (TESSALON) 100 MG Cap Take 1 Cap by  "mouth 3 times a day as needed for Cough. (Patient not taking: Reported on 2020) 60 Cap 0     No current Epic-ordered facility-administered medications on file.       Past Medical History:   Diagnosis Date   • Allergy    • Anxiety    • Bipolar affective (HCC)    • Breast lump 2016   • Depression    • Hyperlipidemia    • Hypertension    • Stroke (HCC)     at age 7 cva due to htn, no residual sx   • Substance abuse (HCC)        Social History     Tobacco Use   • Smoking status: Current Every Day Smoker     Packs/day: 0.50     Years: 35.00     Pack years: 17.50     Types: Cigarettes   • Smokeless tobacco: Never Used   Substance Use Topics   • Alcohol use: No     Alcohol/week: 0.0 oz   • Drug use: Yes     Types: Methamphetamines       Family Status   Relation Name Status   • Mo     • Fa     History reviewed. No pertinent family history.    ROS:  Review of Systems   Constitutional: Positive for fever, chills, positive for and malaise/fatigue.   HENT: Negative for ear pain, nosebleeds, congestion, sore throat and neck pain.    Eyes: Negative for blurred vision.   Respiratory: Positive for cough, sputum production, shortness of breath and wheezing.    Cardiovascular: Negative for chest pain, palpitations, orthopnea and leg swelling.   Gastrointestinal: Negative for heartburn, nausea, vomiting and abdominal pain.   Genitourinary: Negative for dysuria, urgency and frequency.     Exam:  /80   Pulse 82   Temp 37.4 °C (99.3 °F) (Temporal)   Resp 18   Ht 1.6 m (5' 3\")   Wt 44 kg (97 lb)   SpO2 93%   General:  cachectic female in NAD  Head:Normocephalic, atraumatic  Eyes: PERRLA, EOM within normal limits, no conjunctival injection, no scleral icterus, visual fields and acuity grossly intact.  Ears: Normal shape and symmetry, no tenderness, no discharge. External canals are without any significant edema or erythema. Tympanic membranes are without any inflammation, no effusion. Gross auditory " acuity is intact  Nose: Symmetrical without tenderness, no discharge.  Mouth: reasonable hygiene, no erythema exudates or tonsillar enlargement.  Neck: no masses, range of motion within normal limits, no tracheal deviation. No obvious thyroid enlargement.  Pulmonary: chest is symmetrical with respiration, diminished with few scattered wheezes   cardiovascular: regular rate and rhythm without murmurs, rubs, or gallops.  Extremities: no clubbing, cyanosis, or edema.    Please note that this dictation was created using voice recognition software. I have made every reasonable attempt to correct obvious errors, but I expect that there are errors of grammar and possibly content that I did not discover before finalizing the note.    Assessment/Plan:  1. LRTI (lower respiratory tract infection)  doxycycline (VIBRAMYCIN) 100 MG Tab    albuterol 108 (90 Base) MCG/ACT Aero Soln inhalation aerosol    SARS-CoV-2 PCR (24 hour In-House): Collect NP swab in [facility]   2. Cough  albuterol 108 (90 Base) MCG/ACT Aero Soln inhalation aerosol    promethazine-dextromethorphan (PROMETHAZINE-DM) 6.25-15 MG/5ML syrup   Strict ER precautions given, strict isolation until Covid test returns    Followup with primary care in the next 7-10 days if not significantly improving, return to the urgent care or go to the emergency room sooner for any worsening of symptoms.

## 2021-06-30 LAB
COVID ORDER STATUS COVID19: NORMAL
SARS-COV-2 RNA RESP QL NAA+PROBE: NOTDETECTED
SPECIMEN SOURCE: NORMAL

## 2022-11-03 ENCOUNTER — PATIENT MESSAGE (OUTPATIENT)
Dept: HEALTH INFORMATION MANAGEMENT | Facility: OTHER | Age: 62
End: 2022-11-03

## 2022-12-13 ENCOUNTER — NON-PROVIDER VISIT (OUTPATIENT)
Dept: URGENT CARE | Facility: PHYSICIAN GROUP | Age: 62
End: 2022-12-13

## 2022-12-13 DIAGNOSIS — Z02.1 PRE-EMPLOYMENT DRUG SCREENING: ICD-10-CM

## 2022-12-13 LAB
AMP AMPHETAMINE: NORMAL
COC COCAINE: NORMAL
INT CON NEG: NORMAL
INT CON POS: NORMAL
MET METHAMPHETAMINES: NORMAL
OPI OPIATES: NORMAL
PCP PHENCYCLIDINE: NORMAL
POC DRUG COMMENT 753798-OCCUPATIONAL HEALTH: NORMAL
THC: NORMAL

## 2022-12-13 PROCEDURE — 80305 DRUG TEST PRSMV DIR OPT OBS: CPT | Performed by: PHYSICIAN ASSISTANT

## 2022-12-28 ENCOUNTER — EH NON-PROVIDER (OUTPATIENT)
Dept: OCCUPATIONAL MEDICINE | Facility: CLINIC | Age: 62
End: 2022-12-28

## 2022-12-28 DIAGNOSIS — Z02.83 ENCOUNTER FOR DRUG SCREENING: ICD-10-CM

## 2022-12-28 PROCEDURE — 8911 PR MRO FEE: Performed by: NURSE PRACTITIONER
